# Patient Record
Sex: FEMALE | Race: WHITE | NOT HISPANIC OR LATINO
[De-identification: names, ages, dates, MRNs, and addresses within clinical notes are randomized per-mention and may not be internally consistent; named-entity substitution may affect disease eponyms.]

---

## 2017-06-15 ENCOUNTER — APPOINTMENT (OUTPATIENT)
Dept: OTOLARYNGOLOGY | Facility: CLINIC | Age: 67
End: 2017-06-15

## 2017-06-15 VITALS
HEIGHT: 65 IN | SYSTOLIC BLOOD PRESSURE: 116 MMHG | BODY MASS INDEX: 22.66 KG/M2 | WEIGHT: 136 LBS | DIASTOLIC BLOOD PRESSURE: 66 MMHG | HEART RATE: 75 BPM

## 2017-06-15 DIAGNOSIS — Z78.9 OTHER SPECIFIED HEALTH STATUS: ICD-10-CM

## 2018-05-15 ENCOUNTER — APPOINTMENT (OUTPATIENT)
Dept: OTOLARYNGOLOGY | Facility: CLINIC | Age: 68
End: 2018-05-15
Payer: MEDICARE

## 2018-05-15 VITALS
WEIGHT: 136 LBS | SYSTOLIC BLOOD PRESSURE: 99 MMHG | DIASTOLIC BLOOD PRESSURE: 71 MMHG | HEIGHT: 65 IN | HEART RATE: 80 BPM | BODY MASS INDEX: 22.66 KG/M2

## 2018-05-15 PROCEDURE — 69210 REMOVE IMPACTED EAR WAX UNI: CPT

## 2018-05-15 PROCEDURE — 99214 OFFICE O/P EST MOD 30 MIN: CPT | Mod: 25

## 2019-05-23 ENCOUNTER — APPOINTMENT (OUTPATIENT)
Dept: OTOLARYNGOLOGY | Facility: CLINIC | Age: 69
End: 2019-05-23
Payer: MEDICARE

## 2019-05-23 VITALS
DIASTOLIC BLOOD PRESSURE: 55 MMHG | SYSTOLIC BLOOD PRESSURE: 103 MMHG | WEIGHT: 138 LBS | HEART RATE: 80 BPM | BODY MASS INDEX: 22.99 KG/M2 | HEIGHT: 65 IN

## 2019-05-23 PROCEDURE — 69210 REMOVE IMPACTED EAR WAX UNI: CPT

## 2019-05-23 PROCEDURE — 99214 OFFICE O/P EST MOD 30 MIN: CPT | Mod: 25

## 2019-05-23 RX ORDER — LEVOTHYROXINE SODIUM 112 UG/1
112 TABLET ORAL
Qty: 30 | Refills: 0 | Status: ACTIVE | COMMUNITY
Start: 2018-12-06

## 2019-05-23 RX ORDER — ESTRADIOL 0.1 MG/G
0.1 CREAM VAGINAL
Qty: 85 | Refills: 0 | Status: ACTIVE | COMMUNITY
Start: 2018-12-27

## 2019-05-23 RX ORDER — FLUCONAZOLE 150 MG/1
150 TABLET ORAL
Qty: 2 | Refills: 0 | Status: ACTIVE | COMMUNITY
Start: 2019-01-31

## 2019-05-23 RX ORDER — LEVOFLOXACIN 500 MG/1
500 TABLET, FILM COATED ORAL
Qty: 10 | Refills: 0 | Status: ACTIVE | COMMUNITY
Start: 2019-01-09

## 2019-05-23 RX ORDER — CIPROFLOXACIN HYDROCHLORIDE 500 MG/1
500 TABLET, FILM COATED ORAL
Qty: 28 | Refills: 0 | Status: ACTIVE | COMMUNITY
Start: 2019-05-16

## 2019-05-23 RX ORDER — CICLOPIROX 80 MG/ML
8 SOLUTION TOPICAL
Qty: 6 | Refills: 0 | Status: ACTIVE | COMMUNITY
Start: 2019-03-13

## 2019-05-23 RX ORDER — ETANERCEPT 50 MG/ML
50 SOLUTION SUBCUTANEOUS
Qty: 3 | Refills: 0 | Status: ACTIVE | COMMUNITY
Start: 2018-11-13

## 2019-05-23 RX ORDER — NYSTATIN AND TRIAMCINOLONE ACETONIDE 100000; 1 MG/G; MG/G
100000-0.1 CREAM TOPICAL
Qty: 60 | Refills: 0 | Status: ACTIVE | COMMUNITY
Start: 2018-12-27

## 2019-05-23 NOTE — ASSESSMENT
[FreeTextEntry1] : Patient's granddaughter was just baptized O. the staff complaining of clotted ears of cerumen impaction bilaterally which was curetted out with acute interventions indicated she will followup with us as needed.

## 2019-05-23 NOTE — HISTORY OF PRESENT ILLNESS
[de-identified] : Patient is here today with some ear clogging bilaterally and is having issues with clogging. She does not have any issues with ear pain or pressure and is not having any ringing in the ears. SHe is not having any issues with nasal congestion or runny nose.

## 2020-01-29 ENCOUNTER — RESULT REVIEW (OUTPATIENT)
Age: 70
End: 2020-01-29

## 2020-06-16 ENCOUNTER — APPOINTMENT (OUTPATIENT)
Dept: OTOLARYNGOLOGY | Facility: CLINIC | Age: 70
End: 2020-06-16
Payer: MEDICARE

## 2020-06-16 VITALS
HEART RATE: 87 BPM | SYSTOLIC BLOOD PRESSURE: 94 MMHG | HEIGHT: 65 IN | WEIGHT: 136 LBS | TEMPERATURE: 98.8 F | DIASTOLIC BLOOD PRESSURE: 61 MMHG | BODY MASS INDEX: 22.66 KG/M2

## 2020-06-16 PROCEDURE — 69210 REMOVE IMPACTED EAR WAX UNI: CPT

## 2020-06-16 PROCEDURE — 99214 OFFICE O/P EST MOD 30 MIN: CPT | Mod: 25

## 2020-06-16 NOTE — ASSESSMENT
[FreeTextEntry1] : Patient complaining of clogged ears massive cerumen impaction once again curetted out bilaterally otherwise she is doing really well she will follow-up and see us as needed.

## 2020-06-16 NOTE — HISTORY OF PRESENT ILLNESS
[de-identified] : Patient is here today with ear clogging bilaterally. She is not having any pain in the ears or pressure. SHe is not having any problems with ringing in the ears or dizziness. SHe is not having any problems with nasal congestion or runny nose right now. SHe does not clean her ears at home

## 2021-04-13 ENCOUNTER — RESULT REVIEW (OUTPATIENT)
Age: 71
End: 2021-04-13

## 2021-06-24 ENCOUNTER — APPOINTMENT (OUTPATIENT)
Dept: OTOLARYNGOLOGY | Facility: CLINIC | Age: 71
End: 2021-06-24
Payer: MEDICARE

## 2021-06-24 VITALS
BODY MASS INDEX: 22.66 KG/M2 | HEART RATE: 89 BPM | HEIGHT: 65 IN | TEMPERATURE: 98.3 F | WEIGHT: 136 LBS | SYSTOLIC BLOOD PRESSURE: 110 MMHG | DIASTOLIC BLOOD PRESSURE: 63 MMHG

## 2021-06-24 PROCEDURE — 99213 OFFICE O/P EST LOW 20 MIN: CPT | Mod: 25

## 2021-06-24 PROCEDURE — 69210 REMOVE IMPACTED EAR WAX UNI: CPT

## 2021-06-24 PROCEDURE — 92557 COMPREHENSIVE HEARING TEST: CPT

## 2021-06-24 PROCEDURE — 92567 TYMPANOMETRY: CPT

## 2021-06-24 NOTE — ASSESSMENT
[FreeTextEntry1] : Complaining of diminished hearing massive cerumen impaction curetted out audiogram shows the beginning of the presbycusis [place] left slightly worse than the right she will follow-up in 6 months and we will monitor.

## 2021-06-24 NOTE — END OF VISIT
[FreeTextEntry4] : I saw and examined this patient in person. I have discussed with Niecy Sifuentes, Physician Assistant, in detail the above note and agree with the above assessment and plan of care.

## 2021-06-24 NOTE — HISTORY OF PRESENT ILLNESS
[de-identified] : PAtient returns for minor recurrent bilateral ear clogging. SHe does not have any pain in the ears and denies changes in hearing. She is not having any ringing in the ears or dizziness. She does not have any nasal congestion issues or runny nose right now.

## 2022-03-27 ENCOUNTER — RESULT REVIEW (OUTPATIENT)
Age: 72
End: 2022-03-27

## 2022-04-26 ENCOUNTER — APPOINTMENT (OUTPATIENT)
Dept: OTOLARYNGOLOGY | Facility: CLINIC | Age: 72
End: 2022-04-26
Payer: MEDICARE

## 2022-04-26 VITALS
BODY MASS INDEX: 22.66 KG/M2 | TEMPERATURE: 97.3 F | SYSTOLIC BLOOD PRESSURE: 104 MMHG | DIASTOLIC BLOOD PRESSURE: 69 MMHG | HEART RATE: 90 BPM | WEIGHT: 136 LBS | HEIGHT: 65 IN

## 2022-04-26 PROCEDURE — 69210 REMOVE IMPACTED EAR WAX UNI: CPT

## 2022-04-26 PROCEDURE — 99214 OFFICE O/P EST MOD 30 MIN: CPT | Mod: 25

## 2022-04-26 NOTE — HISTORY OF PRESENT ILLNESS
[de-identified] : patient is here for an ear cleaning. She denies pain in the ears but can feel she is clogged. She denies ringingin the ears or dizziness

## 2022-04-26 NOTE — HISTORY OF PRESENT ILLNESS
[de-identified] : patient is here for an ear cleaning. She denies pain in the ears but can feel she is clogged. She denies ringingin the ears or dizziness

## 2022-04-26 NOTE — END OF VISIT
[FreeTextEntry3] : I saw and examined this patient in person. I have discussed with Niecy Sifuentes, Physician Assistant, in detail the above note and agree with the above assessment and plan of care.\par

## 2022-04-26 NOTE — ASSESSMENT
[FreeTextEntry1] : Patient follows up no specific complaint other than diminished hearing and cerumen impaction bilaterally curetted out no further acute intervention indicated will follow up with us as needed.

## 2023-04-10 ENCOUNTER — APPOINTMENT (OUTPATIENT)
Dept: OTOLARYNGOLOGY | Facility: CLINIC | Age: 73
End: 2023-04-10
Payer: MEDICARE

## 2023-04-10 VITALS
HEART RATE: 87 BPM | SYSTOLIC BLOOD PRESSURE: 110 MMHG | WEIGHT: 136 LBS | HEIGHT: 65 IN | DIASTOLIC BLOOD PRESSURE: 54 MMHG | TEMPERATURE: 97.8 F | BODY MASS INDEX: 22.66 KG/M2

## 2023-04-10 PROCEDURE — 69210 REMOVE IMPACTED EAR WAX UNI: CPT

## 2023-04-10 PROCEDURE — 31231 NASAL ENDOSCOPY DX: CPT

## 2023-04-10 PROCEDURE — 99213 OFFICE O/P EST LOW 20 MIN: CPT | Mod: 25

## 2023-04-10 NOTE — HISTORY OF PRESENT ILLNESS
[de-identified] : Patient is here for an ear cleaning. She denies pain in the ears but can feel she is clogged. She denies ringing in the ears or dizziness  [FreeTextEntry1] : Patient comes in today with no acute complaints\par She has been starting to swim again recently. She believes she has a lot of wax. \par SHe also states that she had an operation for her adenoids to be removed 3 times in her youth, and all 3 times the adenoids returned so they stopped removing them

## 2023-04-10 NOTE — HISTORY OF PRESENT ILLNESS
[de-identified] : Patient is here for an ear cleaning. She denies pain in the ears but can feel she is clogged. She denies ringing in the ears or dizziness  [FreeTextEntry1] : Patient comes in today with no acute complaints\par She has been starting to swim again recently. She believes she has a lot of wax. \par SHe also states that she had an operation for her adenoids to be removed 3 times in her youth, and all 3 times the adenoids returned so they stopped removing them

## 2023-04-10 NOTE — ASSESSMENT
[FreeTextEntry1] : I, Dr. Herrmann personally performed the evaluation and management (E/M) services , including all procedures, for this established patient who presents today with (a) new problem(s)/exacerbation of (an) existing condition(s). That E/M includes conducting the clinically appropriate interval history &/or exam, assessing all new/exacerbated conditions, and establishing a new plan of care. Today, my GOOD, Niecy Siufentes, was here to observe &/or participate in the visit & follow plan of care established by me.\par \par \par

## 2023-04-10 NOTE — ASSESSMENT
[FreeTextEntry1] : I, Dr. Herrmann personally performed the evaluation and management (E/M) services , including all procedures, for this established patient who presents today with (a) new problem(s)/exacerbation of (an) existing condition(s). That E/M includes conducting the clinically appropriate interval history &/or exam, assessing all new/exacerbated conditions, and establishing a new plan of care. Today, my GOOD, Niecy Sifuentes, was here to observe &/or participate in the visit & follow plan of care established by me.\par \par \par

## 2024-04-02 ENCOUNTER — APPOINTMENT (OUTPATIENT)
Dept: OTOLARYNGOLOGY | Facility: CLINIC | Age: 74
End: 2024-04-02
Payer: MEDICARE

## 2024-04-02 VITALS
SYSTOLIC BLOOD PRESSURE: 108 MMHG | HEIGHT: 65 IN | WEIGHT: 129 LBS | HEART RATE: 88 BPM | DIASTOLIC BLOOD PRESSURE: 63 MMHG | BODY MASS INDEX: 21.49 KG/M2 | TEMPERATURE: 97.1 F

## 2024-04-02 DIAGNOSIS — H61.23 IMPACTED CERUMEN, BILATERAL: ICD-10-CM

## 2024-04-02 DIAGNOSIS — H90.3 SENSORINEURAL HEARING LOSS, BILATERAL: ICD-10-CM

## 2024-04-02 PROCEDURE — 92557 COMPREHENSIVE HEARING TEST: CPT

## 2024-04-02 PROCEDURE — 92567 TYMPANOMETRY: CPT

## 2024-04-02 PROCEDURE — 69210 REMOVE IMPACTED EAR WAX UNI: CPT

## 2024-04-02 PROCEDURE — 99213 OFFICE O/P EST LOW 20 MIN: CPT | Mod: 25

## 2024-04-02 NOTE — END OF VISIT
[FreeTextEntry3] : I, Dr. Herrmann personally performed the evaluation and management (E/M) services , including all procedures, for this established patient who presents today with (a) new problem(s)/exacerbation of (an) existing condition(s). That E/M includes conducting the clinically appropriate interval history &/or exam, assessing all new/exacerbated conditions, and establishing a new plan of care. Today, my GOOD, Niecy Sifuentes, was here to observe &/or participate in the visit & follow plan of care established by me.

## 2024-04-02 NOTE — ASSESSMENT
[FreeTextEntry1] : Cerumen impaction continued diminished hearing audiogram performed stable follow-up as needed.

## 2024-04-02 NOTE — PHYSICAL EXAM
[Midline] : trachea located in midline position [Normal] : no rashes [de-identified] : cerumen in the ear canals; once removed normal EACs

## 2024-04-02 NOTE — HISTORY OF PRESENT ILLNESS
[de-identified] : Patient is here for an ear cleaning. She denies pain in the ears but can feel she is clogged. She denies ringing in the ears or dizziness  She does admit to some changes in hearing recently

## 2025-04-03 ENCOUNTER — APPOINTMENT (OUTPATIENT)
Dept: OTOLARYNGOLOGY | Facility: CLINIC | Age: 75
End: 2025-04-03
Payer: MEDICARE

## 2025-04-03 VITALS
DIASTOLIC BLOOD PRESSURE: 62 MMHG | TEMPERATURE: 96.7 F | WEIGHT: 129 LBS | HEIGHT: 65 IN | HEART RATE: 93 BPM | BODY MASS INDEX: 21.49 KG/M2 | SYSTOLIC BLOOD PRESSURE: 94 MMHG

## 2025-04-03 DIAGNOSIS — H61.23 IMPACTED CERUMEN, BILATERAL: ICD-10-CM

## 2025-04-03 DIAGNOSIS — H90.3 SENSORINEURAL HEARING LOSS, BILATERAL: ICD-10-CM

## 2025-04-03 PROCEDURE — 92557 COMPREHENSIVE HEARING TEST: CPT

## 2025-04-03 PROCEDURE — 92567 TYMPANOMETRY: CPT

## 2025-04-03 PROCEDURE — 69210 REMOVE IMPACTED EAR WAX UNI: CPT
